# Patient Record
Sex: MALE | Race: WHITE | URBAN - METROPOLITAN AREA
[De-identification: names, ages, dates, MRNs, and addresses within clinical notes are randomized per-mention and may not be internally consistent; named-entity substitution may affect disease eponyms.]

---

## 2019-03-05 ENCOUNTER — Encounter (OUTPATIENT)
Dept: URBAN - METROPOLITAN AREA SURGERY 134 | Facility: SURGERY | Age: 69
End: 2019-03-05
Payer: MEDICARE

## 2019-03-05 PROCEDURE — G8918 PT W/O PREOP ORDER IV AB PRO: HCPCS | Performed by: CLINIC/CENTER

## 2019-03-05 PROCEDURE — G8907 PT DOC NO EVENTS ON DISCHARG: HCPCS | Performed by: CLINIC/CENTER

## 2019-03-28 ENCOUNTER — Encounter (OUTPATIENT)
Dept: URBAN - METROPOLITAN AREA SURGERY 134 | Facility: SURGERY | Age: 69
End: 2019-03-28
Payer: MEDICARE

## 2019-03-28 PROCEDURE — G8918 PT W/O PREOP ORDER IV AB PRO: HCPCS | Performed by: CLINIC/CENTER

## 2019-03-28 PROCEDURE — G8907 PT DOC NO EVENTS ON DISCHARG: HCPCS | Performed by: CLINIC/CENTER

## 2023-05-05 ENCOUNTER — OFFICE VISIT (OUTPATIENT)
Dept: URBAN - METROPOLITAN AREA CLINIC 86 | Facility: CLINIC | Age: 73
End: 2023-05-05
Payer: MEDICARE

## 2023-05-05 DIAGNOSIS — H33.011 RETINAL DETACHMENT WITH SINGLE BREAK, RIGHT EYE: Primary | ICD-10-CM

## 2023-05-05 DIAGNOSIS — H43.811 VITREOUS DEGENERATION, RIGHT EYE: ICD-10-CM

## 2023-05-05 DIAGNOSIS — Z96.1 PRESENCE OF INTRAOCULAR LENS: ICD-10-CM

## 2023-05-05 PROCEDURE — 92134 CPTRZ OPH DX IMG PST SGM RTA: CPT | Performed by: OPHTHALMOLOGY

## 2023-05-05 PROCEDURE — 99204 OFFICE O/P NEW MOD 45 MIN: CPT | Performed by: OPHTHALMOLOGY

## 2023-05-05 RX ORDER — PREDNISOLONE ACETATE 10 MG/ML
1 % SUSPENSION/ DROPS OPHTHALMIC
Qty: 5 | Refills: 2 | Status: INACTIVE
Start: 2023-05-05 | End: 2023-07-03

## 2023-05-05 RX ORDER — CIPROFLOXACIN HYDROCHLORIDE 3 MG/ML
0.3 % SOLUTION/ DROPS OPHTHALMIC
Qty: 5 | Refills: 1 | Status: INACTIVE
Start: 2023-05-05 | End: 2023-06-03

## 2023-05-05 ASSESSMENT — INTRAOCULAR PRESSURE
OS: 14
OD: 13

## 2023-05-05 NOTE — IMPRESSION/PLAN
Impression: Retinal detachment with single break, right eye: H33.011. Right.
-symptoms x 1 day OCT: 05/05/23 OD: no view OS: wnl Plan: Examination reveals a retinal detachment. The diagnosis, natural history, and prognosis of rhegmatogenous retinal detachment, as well as the risks and benefits of intervention were discussed at length. . To reduce the risk of further visual loss, treatment is strongly recommended. The patient was informed of various surgical techniques; altitude precautions with a gas bubble, including the danger of loss of the eye with travel to a higher altitude or flying; and the possible need to update spectacle correction if a scleral buckle is used. The patient understands that the vision usually improves gradually over a period of several months to 1 year, but may not improve to prior levels. The patient elects to proceed with retinal detachment repair. Risk of redetachment, or proliferative vitreoretinopathy, scar formation, macular pucker, hyphema, glaucoma, hypotony, infection, loss of vision, loss of eye, blindness were fully explained to the patient who voices understanding and agreed to proceed with plan.  

PLAN: 25g PPV, EL, C3F8 OD x RD (urgent 5/8 or 5/9)

## 2023-05-08 ENCOUNTER — Encounter (OUTPATIENT)
Dept: URBAN - METROPOLITAN AREA EXTERNAL CLINIC 14 | Facility: EXTERNAL CLINIC | Age: 73
End: 2023-05-08
Payer: MEDICARE

## 2023-05-08 PROCEDURE — 67108 REPAIR DETACHED RETINA: CPT | Performed by: OPHTHALMOLOGY

## 2023-05-09 ENCOUNTER — POST-OPERATIVE VISIT (OUTPATIENT)
Dept: URBAN - METROPOLITAN AREA CLINIC 13 | Facility: CLINIC | Age: 73
End: 2023-05-09
Payer: MEDICARE

## 2023-05-09 DIAGNOSIS — H33.011 RETINAL DETACHMENT WITH SINGLE BREAK, RIGHT EYE: Primary | ICD-10-CM

## 2023-05-09 PROCEDURE — 99024 POSTOP FOLLOW-UP VISIT: CPT | Performed by: OPHTHALMOLOGY

## 2023-05-09 ASSESSMENT — INTRAOCULAR PRESSURE
OS: 13
OD: 8

## 2023-05-09 NOTE — IMPRESSION/PLAN
Impression: S/P 25 PPV, EL, C3F8 OD - 1 Day. Retinal detachment with single break, right eye  H33.011. Plan: PF/Oflox QID OD Sleep on either side Gas precautions RTC 1 week POS DFE OD

## 2023-05-19 ENCOUNTER — POST-OPERATIVE VISIT (OUTPATIENT)
Dept: URBAN - METROPOLITAN AREA CLINIC 86 | Facility: CLINIC | Age: 73
End: 2023-05-19
Payer: MEDICARE

## 2023-05-19 DIAGNOSIS — H33.011 RETINAL DETACHMENT WITH SINGLE BREAK, RIGHT EYE: Primary | ICD-10-CM

## 2023-05-19 PROCEDURE — 99024 POSTOP FOLLOW-UP VISIT: CPT | Performed by: OPHTHALMOLOGY

## 2023-05-19 ASSESSMENT — INTRAOCULAR PRESSURE
OD: 19
OS: 21

## 2023-05-19 NOTE — IMPRESSION/PLAN
Impression: S/P 25 PPV, EL, C3F8 OD - 11 Days. Retinal detachment with single break, right eye  H33.011. Plan: Taper PF 3-2-1. Stop Oflox Gas precautions Sleep on either side RTC 6 weeks POS DFE/OCT OD

## 2023-06-26 ENCOUNTER — POST-OPERATIVE VISIT (OUTPATIENT)
Dept: URBAN - METROPOLITAN AREA CLINIC 86 | Facility: CLINIC | Age: 73
End: 2023-06-26
Payer: MEDICARE

## 2023-06-26 DIAGNOSIS — H33.011 RETINAL DETACHMENT WITH SINGLE BREAK, RIGHT EYE: Primary | ICD-10-CM

## 2023-06-26 PROCEDURE — 99024 POSTOP FOLLOW-UP VISIT: CPT | Performed by: OPHTHALMOLOGY

## 2023-06-26 RX ORDER — KETOROLAC TROMETHAMINE 5 MG/ML
0.5 % SOLUTION/ DROPS OPHTHALMIC
Qty: 10 | Refills: 2 | Status: INACTIVE
Start: 2023-06-26 | End: 2023-06-26

## 2023-06-26 ASSESSMENT — INTRAOCULAR PRESSURE
OD: 12
OS: 16

## 2023-06-26 NOTE — IMPRESSION/PLAN
Impression: S/P 25 PPV, EL, C3F8 OD - 49 Days. Retinal detachment with single break, right eye  H33.011. OCT: 
OD: ERM, [place] Plan: Retina attached. Doing well. SSRD Gas precautions Sleep on either side PF/Acular QID OD

RTC4-6 weeks DFE/OCT OU re-eval

## 2023-07-24 ENCOUNTER — POST-OPERATIVE VISIT (OUTPATIENT)
Dept: URBAN - METROPOLITAN AREA CLINIC 86 | Facility: CLINIC | Age: 73
End: 2023-07-24
Payer: MEDICARE

## 2023-07-24 DIAGNOSIS — H33.011 RETINAL DETACHMENT WITH SINGLE BREAK, RIGHT EYE: Primary | ICD-10-CM

## 2023-07-24 DIAGNOSIS — Z48.810 ENCOUNTER FOR SURGICAL AFTERCARE FOLLOWING SURGERY ON THE SENSE ORGANS: ICD-10-CM

## 2023-07-24 PROCEDURE — 99024 POSTOP FOLLOW-UP VISIT: CPT | Performed by: OPHTHALMOLOGY

## 2023-07-24 ASSESSMENT — INTRAOCULAR PRESSURE
OD: 11
OS: 10